# Patient Record
(demographics unavailable — no encounter records)

---

## 2025-07-24 NOTE — PHYSICAL EXAM
[No Acute Distress] : no acute distress [Ill-Appearing] : ill-appearing [Normal] : normal rate, regular rhythm, normal S1 and S2 and no murmur heard [de-identified] : distended?

## 2025-07-24 NOTE — HISTORY OF PRESENT ILLNESS
[FreeTextEntry1] : sick visit  [de-identified] : 69 yo M new patient presenting today with week of fevers.   He is accompanied by his friend who prefers to translate.  He has not had a PCP.   He has been having high fevers ie 103, chills, no appetite for a week or more.  Denies abdominal pain, N/V/D, urinary symptoms, cough, SOB, wheezing, travel.    He was seen at urgent care on 7/21, COVID/Flu negative, UA with blood, wbc, protein - started on Kelfex.  Urine culture through [facility] is negative for growth.

## 2025-07-24 NOTE — PLAN
[FreeTextEntry1] : #Fevers unknown origin  -UA/UCx done at urgent care results as per HPI, COVID/Flu negative at urgent care  -discussed being seen in the ED today for fevers for 1 week + labs/imaging as needed  -abdomen distended - ? ascites - patient reports baseline abdomen appearance no changes, denies ETOH use  -advised to call/follow-up with me post ED visit

## 2025-07-24 NOTE — ASSESSMENT
[FreeTextEntry1] : 69 yo M new patient presenting today with week of fevers.   [Vaccines Reviewed] : Immunizations reviewed today. Please see immunization details in the vaccine log within the immunization flowsheet.

## 2025-07-24 NOTE — HISTORY OF PRESENT ILLNESS
[FreeTextEntry1] : sick visit  [de-identified] : 71 yo M new patient presenting today with week of fevers.   He is accompanied by his friend who prefers to translate.  He has not had a PCP.   He has been having high fevers ie 103, chills, no appetite for a week or more.  Denies abdominal pain, N/V/D, urinary symptoms, cough, SOB, wheezing, travel.    He was seen at urgent care on 7/21, COVID/Flu negative, UA with blood, wbc, protein - started on Kelfex.  Urine culture through [facility] is negative for growth.

## 2025-07-24 NOTE — PHYSICAL EXAM
[No Acute Distress] : no acute distress [Ill-Appearing] : ill-appearing [Normal] : normal rate, regular rhythm, normal S1 and S2 and no murmur heard [de-identified] : distended?